# Patient Record
Sex: FEMALE
[De-identification: names, ages, dates, MRNs, and addresses within clinical notes are randomized per-mention and may not be internally consistent; named-entity substitution may affect disease eponyms.]

---

## 2017-10-12 ENCOUNTER — HOSPITAL ENCOUNTER (EMERGENCY)
Dept: HOSPITAL 5 - ED | Age: 32
LOS: 7 days | Discharge: PSYCHIATRIC OR SUBSTANCE USE FACILITY | End: 2017-10-19
Payer: COMMERCIAL

## 2017-10-12 DIAGNOSIS — T39.1X2A: ICD-10-CM

## 2017-10-12 DIAGNOSIS — J45.909: ICD-10-CM

## 2017-10-12 DIAGNOSIS — O9A.211: Primary | ICD-10-CM

## 2017-10-12 DIAGNOSIS — F17.200: ICD-10-CM

## 2017-10-12 DIAGNOSIS — O23.31: ICD-10-CM

## 2017-10-12 DIAGNOSIS — Z3A.08: ICD-10-CM

## 2017-10-12 LAB
ALBUMIN SERPL-MCNC: 3.2 G/DL (ref 3.9–5)
ALBUMIN/GLOB SERPL: 1 %
ALP SERPL-CCNC: 43 UNITS/L (ref 35–129)
ALT SERPL-CCNC: 16 UNITS/L (ref 7–56)
ANION GAP SERPL CALC-SCNC: 13 MMOL/L
BACTERIA #/AREA URNS HPF: (no result) /HPF
BASOPHILS NFR BLD AUTO: 0.7 % (ref 0–1.8)
BILIRUB SERPL-MCNC: < 0.2 MG/DL (ref 0.1–1.2)
BILIRUB UR QL STRIP: (no result)
BLOOD UR QL VISUAL: (no result)
BUN SERPL-MCNC: 8 MG/DL (ref 7–17)
BUN/CREAT SERPL: 13 %
CALCIUM SERPL-MCNC: 8.5 MG/DL (ref 8.4–10.2)
CHLORIDE SERPL-SCNC: 103.4 MMOL/L (ref 98–107)
CO2 SERPL-SCNC: 25 MMOL/L (ref 22–30)
EOSINOPHIL NFR BLD AUTO: 6.2 % (ref 0–4.3)
GLUCOSE SERPL-MCNC: 96 MG/DL (ref 65–100)
HCT VFR BLD CALC: 32.2 % (ref 30.3–42.9)
HGB BLD-MCNC: 10 GM/DL (ref 10.1–14.3)
KETONES UR STRIP-MCNC: (no result) MG/DL
LEUKOCYTE ESTERASE UR QL STRIP: (no result)
MCH RBC QN AUTO: 24 PG (ref 28–32)
MCHC RBC AUTO-ENTMCNC: 31 % (ref 30–34)
MCV RBC AUTO: 78 FL (ref 79–97)
MUCOUS THREADS #/AREA URNS HPF: (no result) /HPF
NITRITE UR QL STRIP: (no result)
PH UR STRIP: 6 [PH] (ref 5–7)
PLATELET # BLD: 277 K/MM3 (ref 140–440)
POTASSIUM SERPL-SCNC: 4.1 MMOL/L (ref 3.6–5)
PROT SERPL-MCNC: 6.3 G/DL (ref 6.3–8.2)
PROT UR STRIP-MCNC: (no result) MG/DL
RBC # BLD AUTO: 4.14 M/MM3 (ref 3.65–5.03)
RBC #/AREA URNS HPF: 1 /HPF (ref 0–6)
SODIUM SERPL-SCNC: 137 MMOL/L (ref 137–145)
URINE DRUGS OF ABUSE NOTE: (no result)
UROBILINOGEN UR-MCNC: < 2 MG/DL (ref ?–2)
WBC # BLD AUTO: 8.2 K/MM3 (ref 4.5–11)
WBC #/AREA URNS HPF: 18 /HPF (ref 0–6)

## 2017-10-12 PROCEDURE — 76801 OB US < 14 WKS SINGLE FETUS: CPT

## 2017-10-12 PROCEDURE — 93005 ELECTROCARDIOGRAM TRACING: CPT

## 2017-10-12 PROCEDURE — 80320 DRUG SCREEN QUANTALCOHOLS: CPT

## 2017-10-12 PROCEDURE — 80307 DRUG TEST PRSMV CHEM ANLYZR: CPT

## 2017-10-12 PROCEDURE — 84703 CHORIONIC GONADOTROPIN ASSAY: CPT

## 2017-10-12 PROCEDURE — 81001 URINALYSIS AUTO W/SCOPE: CPT

## 2017-10-12 PROCEDURE — 93010 ELECTROCARDIOGRAM REPORT: CPT

## 2017-10-12 PROCEDURE — 84702 CHORIONIC GONADOTROPIN TEST: CPT

## 2017-10-12 PROCEDURE — 85025 COMPLETE CBC W/AUTO DIFF WBC: CPT

## 2017-10-12 PROCEDURE — 96375 TX/PRO/DX INJ NEW DRUG ADDON: CPT

## 2017-10-12 PROCEDURE — 36415 COLL VENOUS BLD VENIPUNCTURE: CPT

## 2017-10-12 PROCEDURE — 80053 COMPREHEN METABOLIC PANEL: CPT

## 2017-10-12 PROCEDURE — 99285 EMERGENCY DEPT VISIT HI MDM: CPT

## 2017-10-12 PROCEDURE — 96365 THER/PROPH/DIAG IV INF INIT: CPT

## 2017-10-12 PROCEDURE — G0480 DRUG TEST DEF 1-7 CLASSES: HCPCS

## 2017-10-12 PROCEDURE — 76817 TRANSVAGINAL US OBSTETRIC: CPT

## 2017-10-12 PROCEDURE — 96361 HYDRATE IV INFUSION ADD-ON: CPT

## 2017-10-12 NOTE — ULTRASOUND REPORT
FINAL REPORT



EXAM:  US OB \T\lt; = 14 WEEKS FETUS



HISTORY:  acess the fetus patient recently overdosed 



TECHNIQUE:  Real-time sonography was performed of the gravid

uterus transabdominally and endovaginally and images are

submitted for interpretation. 



PRIORS:  None.



FINDINGS:  

The uterus appears normal and has a grossly normal appearing

gestational sac. There is a normal appearing fetal pole measuring

2.2 centimeters for an estimated gestational age of 8 weeks 6

days. A normal-appearing yolk sac is identified. The fetal heart

is beating at a rate of 160 beats per minute. Both ovaries are

visualized and appear normal. The right ovary measures 2.9 x 2.3

x 3.1 cm and the left ovary measures 2.8 x 1.5 x 2.3 cm. There is

a collapsed corpus luteum in the right ovary. 



IMPRESSION:  

Single live intrauterine gestation, estimated gestational age 8

weeks 6 days for an estimated date of confinement of 05/18/2018

## 2017-10-12 NOTE — ULTRASOUND REPORT
FINAL REPORT



EXAM:  US OB TRANSVAGINAL



HISTORY:  PT RECENT OD;  ATTEMPTED SUICIDE 



TECHNIQUE:  Real-time sonography was performed of the gravid

uterus transabdominally and endovaginally and images are

submitted for interpretation. 



PRIORS:  None.



FINDINGS:  

 The uterus appears normal and has a grossly normal appearing

gestational sac. There is a normal appearing fetal pole measuring

2.2 centimeters for an estimated gestational age of 8 weeks 6

days. A normal-appearing yolk sac is identified. The fetal heart

is beating at a rate of 160 beats per minute. Both ovaries are

visualized and appear normal. The right ovary measures 2.9 x 2.3

x 3.1 cm and the left ovary measures 2.8 x 1.5 x 2.3 cm. There is

a collapsed corpus luteum in the right ovary. 



IMPRESSION:  

Single live intrauterine gestation, estimated gestational age 8

weeks 6 days for an estimated date of confinement of 05/18/2018

## 2017-10-12 NOTE — EMERGENCY DEPARTMENT REPORT
ED General Adult HPI





- General


Chief complaint: Overdose


Stated complaint: POSS SUICIDE ATTEMPT


Time Seen by Provider: 10/12/17 16:16


Source: patient


Mode of arrival: Stretcher


Limitations: No Limitations





- History of Present Illness


Initial comments: 





Patient is a 32-year-old female no significant past medical history presents 

status post suicide attempt.  Patient took one bottle of over-the-counter 

medication.  She states that it was "sleep medication with a PM on the label."  

Patient is not aware of the exact amount until she took or the exact contents 

of the bottle.  Patient feels tired and feels that her stomach is burning.  She 

states her abdominal pain is a 6 out of 10 nothing makes her pain better or 

worse.  Patient denies having any chest pain or having any headache.





- Related Data


 Home Medications











 Medication  Instructions  Recorded  Confirmed  Last Taken


 


ALBUTEROL Inhaler [Proair] 2 puff IH QID PRN 01/12/14 10/12/17 02/13/14 23:00


 


Fluticasone/Salmeterol [Advair 1 puff IH BID 01/12/14 10/12/17 01/12/14





Diskus 250-50 mcg]    


 


Pnv95/Ferrous Fumarate/FA 1 each PO QDAY 01/12/14 10/12/17 02/17/14 05:30





[Prenatal Vitamins]    1 tablet











 Allergies











Allergy/AdvReac Type Severity Reaction Status Date / Time


 


fish derived Allergy  Anaphylaxis Verified 02/18/14 08:41














ED Review of Systems


ROS: 


Stated complaint: POSS SUICIDE ATTEMPT


Other details as noted in HPI





Constitutional: weakness.  denies: chills, fever


Eyes: denies: eye pain, eye discharge, vision change


ENT: denies: ear pain, throat pain


Respiratory: denies: cough, shortness of breath, wheezing


Cardiovascular: denies: chest pain, palpitations


Endocrine: no symptoms reported


Gastrointestinal: abdominal pain, nausea.  denies: diarrhea


Genitourinary: denies: urgency, dysuria, discharge


Musculoskeletal: denies: back pain, joint swelling, arthralgia


Skin: denies: rash, lesions


Neurological: denies: headache, weakness, paresthesias


Psychiatric: suicidal thoughts.  denies: anxiety, depression


Hematological/Lymphatic: denies: easy bleeding, easy bruising





ED Past Medical Hx





- Past Medical History


Hx Hypertension: No


Hx Congestive Heart Failure: No


Hx Diabetes: No


Hx Deep Vein Thrombosis: No


Hx Renal Disease: No


Hx Sickle Cell Disease: No


Hx Seizures: No


Hx Asthma: Yes


Hx COPD: No


Hx HIV: No





- Surgical History


Hx Cholecystectomy: Yes


Hx Appendectomy: Yes





- Social History


Smoking Status: Current Every Day Smoker


Substance Use Type: None





- Medications


Home Medications: 


 Home Medications











 Medication  Instructions  Recorded  Confirmed  Last Taken  Type


 


ALBUTEROL Inhaler [Proair] 2 puff IH QID PRN 01/12/14 10/12/17 02/13/14 23:00 

History


 


Fluticasone/Salmeterol [Advair 1 puff IH BID 01/12/14 10/12/17 01/12/14 History





Diskus 250-50 mcg]     


 


Pnv95/Ferrous Fumarate/FA 1 each PO QDAY 01/12/14 10/12/17 02/17/14 05:30 

History





[Prenatal Vitamins]    1 tablet 














ED Physical Exam





- General


Limitations: No Limitations


General appearance: alert, in no apparent distress





- Head


Head exam: Present: atraumatic, normocephalic





- Eye


Eye exam: Present: normal appearance





- ENT


ENT exam: Present: mucous membranes moist





- Neck


Neck exam: Present: normal inspection





- Respiratory


Respiratory exam: Present: normal lung sounds bilaterally.  Absent: respiratory 

distress





- Cardiovascular


Cardiovascular Exam: Present: normal rhythm, tachycardia.  Absent: systolic 

murmur, diastolic murmur, rubs, gallop





- GI/Abdominal


GI/Abdominal exam: Present: soft, normal bowel sounds





- Extremities Exam


Extremities exam: Present: normal inspection





- Back Exam


Back exam: Present: normal inspection





- Neurological Exam


Neurological exam: Present: alert, oriented X3





- Psychiatric


Psychiatric exam: Present: depressed, flat affect, suicidal ideation





- Skin


Skin exam: Present: warm, dry, intact, normal color.  Absent: rash





ED Course


 Vital Signs











  10/12/17 10/12/17 10/12/17





  16:14 18:46 20:15


 


Temperature 98.4 F  98.0 F


 


Pulse Rate 98 H 94 H 71


 


Respiratory 18  16





Rate   


 


Blood Pressure 126/73  


 


Blood Pressure  122/74 122/80





[Left]   


 


O2 Sat by Pulse 100  100





Oximetry   














- Consultations


Consultation #1: 





10/12/17 15:02


Called poison control patient's Tylenol level is 120 she will need a repeat 

Tylenol level in 4 hours.


Consultation #2: 





10/12/17 19:01


Discussed with poison control patient is cleared due to repeat Tylenol level 

being less than 150.  It is 93.





ED Medical Decision Making





- Lab Data


Result diagrams: 


 10/12/17 16:47





 10/12/17 16:47








 Lab Results











  10/12/17 10/12/17 10/12/17 Range/Units





  16:47 16:47 16:47 


 


WBC    8.2  (4.5-11.0)  K/mm3


 


RBC    4.14  (3.65-5.03)  M/mm3


 


Hgb    10.0 L  (10.1-14.3)  gm/dl


 


Hct    32.2  (30.3-42.9)  %


 


MCV    78 L  (79-97)  fl


 


MCH    24 L  (28-32)  pg


 


MCHC    31  (30-34)  %


 


RDW    19.0 H  (13.2-15.2)  %


 


Plt Count    277  (140-440)  K/mm3


 


Lymph % (Auto)    27.6  (13.4-35.0)  %


 


Mono % (Auto)    9.0 H  (0.0-7.3)  %


 


Eos % (Auto)    6.2 H  (0.0-4.3)  %


 


Baso % (Auto)    0.7  (0.0-1.8)  %


 


Lymph #    2.3  (1.2-5.4)  K/mm3


 


Mono #    0.7  (0.0-0.8)  K/mm3


 


Eos #    0.5 H  (0.0-0.4)  K/mm3


 


Baso #    0.1  (0.0-0.1)  K/mm3


 


Seg Neutrophils %    56.5  (40.0-70.0)  %


 


Seg Neutrophils #    4.6  (1.8-7.7)  K/mm3


 


Sodium  137    (137-145)  mmol/L


 


Potassium  4.1    (3.6-5.0)  mmol/L


 


Chloride  103.4    ()  mmol/L


 


Carbon Dioxide  25    (22-30)  mmol/L


 


Anion Gap  13    mmol/L


 


BUN  8    (7-17)  mg/dL


 


Creatinine  0.6 L    (0.7-1.2)  mg/dL


 


Estimated GFR  > 60    ml/min


 


BUN/Creatinine Ratio  13    %


 


Glucose  96    ()  mg/dL


 


Calcium  8.5    (8.4-10.2)  mg/dL


 


Total Bilirubin  < 0.20    (0.1-1.2)  mg/dL


 


AST  12    (5-40)  units/L


 


ALT  16    (7-56)  units/L


 


Alkaline Phosphatase  43    ()  units/L


 


Total Protein  6.3    (6.3-8.2)  g/dL


 


Albumin  3.2 L    (3.9-5)  g/dL


 


Albumin/Globulin Ratio  1.0    %


 


HCG, Qual     (Negative)  


 


HCG, Quant     (0-4)  mIU/mL


 


Urine Color     (Yellow)  


 


Urine Turbidity     (Clear)  


 


Urine pH     (5.0-7.0)  


 


Ur Specific Gravity     (1.003-1.030)  


 


Urine Protein     (Negative)  mg/dL


 


Urine Glucose (UA)     (Negative)  mg/dL


 


Urine Ketones     (Negative)  mg/dL


 


Urine Blood     (Negative)  


 


Urine Nitrite     (Negative)  


 


Urine Bilirubin     (Negative)  


 


Urine Urobilinogen     (<2.0)  mg/dL


 


Ur Leukocyte Esterase     (Negative)  


 


Urine WBC (Auto)     (0.0-6.0)  /HPF


 


Urine RBC (Auto)     (0.0-6.0)  /HPF


 


U Epithel Cells (Auto)     (0-13.0)  /HPF


 


Urine Bacteria (Auto)     (Negative)  /HPF


 


Urine Mucus     /HPF


 


Salicylates   < 0.3 L   (2.8-20.0)  mg/dL


 


Urine Opiates Screen     


 


Urine Methadone Screen     


 


Acetaminophen     (10.0-30.0)  ug/mL


 


Ur Barbiturates Screen     


 


Ur Phencyclidine Scrn     


 


Ur Amphetamines Screen     


 


U Benzodiazepines Scrn     


 


Urine Cocaine Screen     


 


U Marijuana (THC) Screen     


 


Drugs of Abuse Note     


 


Plasma/Serum Alcohol     (0-0.07)  gm%














  10/12/17 10/12/17 10/12/17 Range/Units





  16:47 16:52 16:52 


 


WBC     (4.5-11.0)  K/mm3


 


RBC     (3.65-5.03)  M/mm3


 


Hgb     (10.1-14.3)  gm/dl


 


Hct     (30.3-42.9)  %


 


MCV     (79-97)  fl


 


MCH     (28-32)  pg


 


MCHC     (30-34)  %


 


RDW     (13.2-15.2)  %


 


Plt Count     (140-440)  K/mm3


 


Lymph % (Auto)     (13.4-35.0)  %


 


Mono % (Auto)     (0.0-7.3)  %


 


Eos % (Auto)     (0.0-4.3)  %


 


Baso % (Auto)     (0.0-1.8)  %


 


Lymph #     (1.2-5.4)  K/mm3


 


Mono #     (0.0-0.8)  K/mm3


 


Eos #     (0.0-0.4)  K/mm3


 


Baso #     (0.0-0.1)  K/mm3


 


Seg Neutrophils %     (40.0-70.0)  %


 


Seg Neutrophils #     (1.8-7.7)  K/mm3


 


Sodium     (137-145)  mmol/L


 


Potassium     (3.6-5.0)  mmol/L


 


Chloride     ()  mmol/L


 


Carbon Dioxide     (22-30)  mmol/L


 


Anion Gap     mmol/L


 


BUN     (7-17)  mg/dL


 


Creatinine     (0.7-1.2)  mg/dL


 


Estimated GFR     ml/min


 


BUN/Creatinine Ratio     %


 


Glucose     ()  mg/dL


 


Calcium     (8.4-10.2)  mg/dL


 


Total Bilirubin     (0.1-1.2)  mg/dL


 


AST     (5-40)  units/L


 


ALT     (7-56)  units/L


 


Alkaline Phosphatase     ()  units/L


 


Total Protein     (6.3-8.2)  g/dL


 


Albumin     (3.9-5)  g/dL


 


Albumin/Globulin Ratio     %


 


HCG, Qual  Positive    (Negative)  


 


HCG, Quant     (0-4)  mIU/mL


 


Urine Color     (Yellow)  


 


Urine Turbidity     (Clear)  


 


Urine pH     (5.0-7.0)  


 


Ur Specific Gravity     (1.003-1.030)  


 


Urine Protein     (Negative)  mg/dL


 


Urine Glucose (UA)     (Negative)  mg/dL


 


Urine Ketones     (Negative)  mg/dL


 


Urine Blood     (Negative)  


 


Urine Nitrite     (Negative)  


 


Urine Bilirubin     (Negative)  


 


Urine Urobilinogen     (<2.0)  mg/dL


 


Ur Leukocyte Esterase     (Negative)  


 


Urine WBC (Auto)     (0.0-6.0)  /HPF


 


Urine RBC (Auto)     (0.0-6.0)  /HPF


 


U Epithel Cells (Auto)     (0-13.0)  /HPF


 


Urine Bacteria (Auto)     (Negative)  /HPF


 


Urine Mucus     /HPF


 


Salicylates     (2.8-20.0)  mg/dL


 


Urine Opiates Screen     


 


Urine Methadone Screen     


 


Acetaminophen   102.7 H   (10.0-30.0)  ug/mL


 


Ur Barbiturates Screen     


 


Ur Phencyclidine Scrn     


 


Ur Amphetamines Screen     


 


U Benzodiazepines Scrn     


 


Urine Cocaine Screen     


 


U Marijuana (THC) Screen     


 


Drugs of Abuse Note     


 


Plasma/Serum Alcohol    < 0.01  (0-0.07)  gm%














  10/12/17 10/12/17 10/12/17 Range/Units





  17:41 18:19 18:19 


 


WBC     (4.5-11.0)  K/mm3


 


RBC     (3.65-5.03)  M/mm3


 


Hgb     (10.1-14.3)  gm/dl


 


Hct     (30.3-42.9)  %


 


MCV     (79-97)  fl


 


MCH     (28-32)  pg


 


MCHC     (30-34)  %


 


RDW     (13.2-15.2)  %


 


Plt Count     (140-440)  K/mm3


 


Lymph % (Auto)     (13.4-35.0)  %


 


Mono % (Auto)     (0.0-7.3)  %


 


Eos % (Auto)     (0.0-4.3)  %


 


Baso % (Auto)     (0.0-1.8)  %


 


Lymph #     (1.2-5.4)  K/mm3


 


Mono #     (0.0-0.8)  K/mm3


 


Eos #     (0.0-0.4)  K/mm3


 


Baso #     (0.0-0.1)  K/mm3


 


Seg Neutrophils %     (40.0-70.0)  %


 


Seg Neutrophils #     (1.8-7.7)  K/mm3


 


Sodium     (137-145)  mmol/L


 


Potassium     (3.6-5.0)  mmol/L


 


Chloride     ()  mmol/L


 


Carbon Dioxide     (22-30)  mmol/L


 


Anion Gap     mmol/L


 


BUN     (7-17)  mg/dL


 


Creatinine     (0.7-1.2)  mg/dL


 


Estimated GFR     ml/min


 


BUN/Creatinine Ratio     %


 


Glucose     ()  mg/dL


 


Calcium     (8.4-10.2)  mg/dL


 


Total Bilirubin     (0.1-1.2)  mg/dL


 


AST     (5-40)  units/L


 


ALT     (7-56)  units/L


 


Alkaline Phosphatase     ()  units/L


 


Total Protein     (6.3-8.2)  g/dL


 


Albumin     (3.9-5)  g/dL


 


Albumin/Globulin Ratio     %


 


HCG, Qual     (Negative)  


 


HCG, Quant  016159 H    (0-4)  mIU/mL


 


Urine Color   Yellow   (Yellow)  


 


Urine Turbidity   Clear   (Clear)  


 


Urine pH   6.0   (5.0-7.0)  


 


Ur Specific Gravity   1.014   (1.003-1.030)  


 


Urine Protein   <15 mg/dl   (Negative)  mg/dL


 


Urine Glucose (UA)   Neg   (Negative)  mg/dL


 


Urine Ketones   Neg   (Negative)  mg/dL


 


Urine Blood   Neg   (Negative)  


 


Urine Nitrite   Pos   (Negative)  


 


Urine Bilirubin   Neg   (Negative)  


 


Urine Urobilinogen   < 2.0   (<2.0)  mg/dL


 


Ur Leukocyte Esterase   Tr   (Negative)  


 


Urine WBC (Auto)   18.0 H   (0.0-6.0)  /HPF


 


Urine RBC (Auto)   1.0   (0.0-6.0)  /HPF


 


U Epithel Cells (Auto)   < 1.0   (0-13.0)  /HPF


 


Urine Bacteria (Auto)   2+   (Negative)  /HPF


 


Urine Mucus   Few   /HPF


 


Salicylates     (2.8-20.0)  mg/dL


 


Urine Opiates Screen    Presumptive negative  


 


Urine Methadone Screen    Presumptive negative  


 


Acetaminophen     (10.0-30.0)  ug/mL


 


Ur Barbiturates Screen    Presumptive negative  


 


Ur Phencyclidine Scrn    Presumptive negative  


 


Ur Amphetamines Screen    Presumptive positive  


 


U Benzodiazepines Scrn    Presumptive negative  


 


Urine Cocaine Screen    Presumptive negative  


 


U Marijuana (THC) Screen    Presumptive negative  


 


Drugs of Abuse Note    Disclamer  


 


Plasma/Serum Alcohol     (0-0.07)  gm%














  10/12/17 Range/Units





  Unknown 


 


WBC   (4.5-11.0)  K/mm3


 


RBC   (3.65-5.03)  M/mm3


 


Hgb   (10.1-14.3)  gm/dl


 


Hct   (30.3-42.9)  %


 


MCV   (79-97)  fl


 


MCH   (28-32)  pg


 


MCHC   (30-34)  %


 


RDW   (13.2-15.2)  %


 


Plt Count   (140-440)  K/mm3


 


Lymph % (Auto)   (13.4-35.0)  %


 


Mono % (Auto)   (0.0-7.3)  %


 


Eos % (Auto)   (0.0-4.3)  %


 


Baso % (Auto)   (0.0-1.8)  %


 


Lymph #   (1.2-5.4)  K/mm3


 


Mono #   (0.0-0.8)  K/mm3


 


Eos #   (0.0-0.4)  K/mm3


 


Baso #   (0.0-0.1)  K/mm3


 


Seg Neutrophils %   (40.0-70.0)  %


 


Seg Neutrophils #   (1.8-7.7)  K/mm3


 


Sodium   (137-145)  mmol/L


 


Potassium   (3.6-5.0)  mmol/L


 


Chloride   ()  mmol/L


 


Carbon Dioxide   (22-30)  mmol/L


 


Anion Gap   mmol/L


 


BUN   (7-17)  mg/dL


 


Creatinine   (0.7-1.2)  mg/dL


 


Estimated GFR   ml/min


 


BUN/Creatinine Ratio   %


 


Glucose   ()  mg/dL


 


Calcium   (8.4-10.2)  mg/dL


 


Total Bilirubin   (0.1-1.2)  mg/dL


 


AST   (5-40)  units/L


 


ALT   (7-56)  units/L


 


Alkaline Phosphatase   ()  units/L


 


Total Protein   (6.3-8.2)  g/dL


 


Albumin   (3.9-5)  g/dL


 


Albumin/Globulin Ratio   %


 


HCG, Qual   (Negative)  


 


HCG, Quant   (0-4)  mIU/mL


 


Urine Color   (Yellow)  


 


Urine Turbidity   (Clear)  


 


Urine pH   (5.0-7.0)  


 


Ur Specific Gravity   (1.003-1.030)  


 


Urine Protein   (Negative)  mg/dL


 


Urine Glucose (UA)   (Negative)  mg/dL


 


Urine Ketones   (Negative)  mg/dL


 


Urine Blood   (Negative)  


 


Urine Nitrite   (Negative)  


 


Urine Bilirubin   (Negative)  


 


Urine Urobilinogen   (<2.0)  mg/dL


 


Ur Leukocyte Esterase   (Negative)  


 


Urine WBC (Auto)   (0.0-6.0)  /HPF


 


Urine RBC (Auto)   (0.0-6.0)  /HPF


 


U Epithel Cells (Auto)   (0-13.0)  /HPF


 


Urine Bacteria (Auto)   (Negative)  /HPF


 


Urine Mucus   /HPF


 


Salicylates   (2.8-20.0)  mg/dL


 


Urine Opiates Screen   


 


Urine Methadone Screen   


 


Acetaminophen  93.6 H  (10.0-30.0)  ug/mL


 


Ur Barbiturates Screen   


 


Ur Phencyclidine Scrn   


 


Ur Amphetamines Screen   


 


U Benzodiazepines Scrn   


 


Urine Cocaine Screen   


 


U Marijuana (THC) Screen   


 


Drugs of Abuse Note   


 


Plasma/Serum Alcohol   (0-0.07)  gm%














- EKG Data


-: EKG Interpreted by Me





- EKG Data





10/13/17 01:06


EKG shows normal sinus rhythm no T wave inversion and no axis deviation no ST 

segment elevation





- Radiology Data


Radiology results: report reviewed, image reviewed





Transvaginal ultrasound: Shows viable fetus at 8 weeks with viable heart rate.





- Medical Decision Making





Chief medical diagnosis: Acetaminophen overdose


Differential medical diagnosis: Salicylate overdose, NSAID overdose, major 

depressive disorder








I will get CBC, CMP, urine drug screen, salicylate, Tylenol level, IV fluids, 

mental health evaluation and poison control consult








Patient will need to be 1013 due to her trying to attempt suicide.  Patient has 

been medically cleared as her Tylenol level after 4 hours is not 150.  Patient 

does not need NAC.  Patient will get IV antibiotics for her UTI.


Critical care attestation.: 


If time is entered above; I have spent that time in minutes in the direct care 

of this critically ill patient, excluding procedure time.








ED Disposition


Clinical Impression: 


 Suicide attempt





Pregnancy


Qualifiers:


 Weeks of gestation: 8 weeks Qualified Code(s): Z3A.08 - 8 weeks gestation of 

pregnancy





UTI (urinary tract infection)


Qualifiers:


 Urinary tract infection type: acute cystitis Hematuria presence: without 

hematuria Qualified Code(s): N30.00 - Acute cystitis without hematuria





Tylenol overdose


Qualifiers:


 Encounter type: initial encounter Injury intent: intentional self-harm 

Qualified Code(s): T39.1X2A - Poisoning by 4-Aminophenol derivatives, 

intentional self-harm, initial encounter





Disposition: DC/TX-65 PSY HOSP/PSY UNIT


Is pt being admited?: No


Does the pt Need Aspirin: No


Condition: Stable


Referrals: 


PRIMARY CARE,MD [Primary Care Provider] - 3-5 Days

## 2017-10-13 NOTE — CONSULTATION
History of Present Illness





- Reason for Consult


Consult date: 10/13/17


Reason for consult: Mental Health Evaluation


Requesting physician: NGHIA KUMAR





- Chief Complaint


Chief complaint: 


"I was overwhelmed"








- History of Present Psychiatric Illness


32-year-old female no significant past medical history presents status post 

suicide attempt. Today patient is calm, but guarded during the assessment. She 

stated that she felt "overwhelmed with life" and took multiple Tylenol PM 

pills. She stated that she did not want to wake up when she took the pills. 

When asked to explained the stressors she maybe be dealing with, she would not 

answer the question. She kept stating, "I was overwhelmed that's all." She 

denies a past suicidal attempt. She stated that she was dx with ADHD as a child 

and take Ritalin. Patient is positive for amphetamines. She denies SI/HI's, AVH'

s, and depression symptoms. She denies recreational drug use and excessive 

alcohol consumption (etoh). 








Medications and Allergies


 Allergies











Allergy/AdvReac Type Severity Reaction Status Date / Time


 


fish derived Allergy  Anaphylaxis Verified 02/18/14 08:41











 Home Medications











 Medication  Instructions  Recorded  Confirmed  Last Taken  Type


 


ALBUTEROL Inhaler [Proair] 2 puff IH QID PRN 01/12/14 10/12/17 02/13/14 23:00 

History


 


Fluticasone/Salmeterol [Advair 1 puff IH BID 01/12/14 10/12/17 01/12/14 History





Diskus 250-50 mcg]     


 


Pnv95/Ferrous Fumarate/FA 1 each PO QDAY 01/12/14 10/12/17 02/17/14 05:30 

History





[Prenatal Vitamins]    1 tablet 














Past psychiatric history





- Past Medical History


Past Medical History: other (Currently Pregnant)


Past Surgical History: appendectomy, cholecystectomy





- past Psychiatric treatment and history


psychiatric treatment history: 


Dx with ADHD as a child. Denies a fam psy.








- Social History


Social history: lives with family (GED)





Mental Status Exam





- Vital signs


 Last Vital Signs











Temp  98.9 F   10/13/17 10:59


 


Pulse  88   10/13/17 10:59


 


Resp  16   10/13/17 10:59


 


BP  116/69   10/13/17 10:59


 


Pulse Ox  97   10/13/17 10:59














- Exam


Narrative exam: 


MSE:





 Appearance: calm


 Behavior: regular eye contact


 Speech: regular rate and tone


 Mood: guarded


 Affect: flat 


 Thought Process: circumstantial


 Thought Content: denies SI/HI's and VH's


 Motor Activity: ambulatory


 Cognition: A/O x3


 Insight: variable


 Judgment: variable











Results


Result Diagrams: 


 10/12/17 16:47





 10/12/17 16:47


 Abnormal lab results











  10/12/17 10/12/17 10/12/17 Range/Units





  16:47 16:47 16:47 


 


Hgb    10.0 L  (10.1-14.3)  gm/dl


 


MCV    78 L  (79-97)  fl


 


MCH    24 L  (28-32)  pg


 


RDW    19.0 H  (13.2-15.2)  %


 


Mono % (Auto)    9.0 H  (0.0-7.3)  %


 


Eos % (Auto)    6.2 H  (0.0-4.3)  %


 


Eos #    0.5 H  (0.0-0.4)  K/mm3


 


Creatinine  0.6 L    (0.7-1.2)  mg/dL


 


Albumin  3.2 L    (3.9-5)  g/dL


 


HCG, Quant     (0-4)  mIU/mL


 


Urine WBC (Auto)     (0.0-6.0)  /HPF


 


Salicylates   < 0.3 L   (2.8-20.0)  mg/dL


 


Acetaminophen     (10.0-30.0)  ug/mL














  10/12/17 10/12/17 10/12/17 Range/Units





  16:52 17:41 18:19 


 


Hgb     (10.1-14.3)  gm/dl


 


MCV     (79-97)  fl


 


MCH     (28-32)  pg


 


RDW     (13.2-15.2)  %


 


Mono % (Auto)     (0.0-7.3)  %


 


Eos % (Auto)     (0.0-4.3)  %


 


Eos #     (0.0-0.4)  K/mm3


 


Creatinine     (0.7-1.2)  mg/dL


 


Albumin     (3.9-5)  g/dL


 


HCG, Quant   516851 H   (0-4)  mIU/mL


 


Urine WBC (Auto)    18.0 H  (0.0-6.0)  /HPF


 


Salicylates     (2.8-20.0)  mg/dL


 


Acetaminophen  102.7 H    (10.0-30.0)  ug/mL














  10/12/17 10/13/17 Range/Units





  Unknown 00:42 


 


Hgb    (10.1-14.3)  gm/dl


 


MCV    (79-97)  fl


 


MCH    (28-32)  pg


 


RDW    (13.2-15.2)  %


 


Mono % (Auto)    (0.0-7.3)  %


 


Eos % (Auto)    (0.0-4.3)  %


 


Eos #    (0.0-0.4)  K/mm3


 


Creatinine    (0.7-1.2)  mg/dL


 


Albumin    (3.9-5)  g/dL


 


HCG, Quant    (0-4)  mIU/mL


 


Urine WBC (Auto)    (0.0-6.0)  /HPF


 


Salicylates    (2.8-20.0)  mg/dL


 


Acetaminophen  93.6 H  57.7 H  (10.0-30.0)  ug/mL








All other labs normal.








Assessment and Plan


Assessment and plan: 


Impression: Historical Dx: ADHD. 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 Patient is calm, but 

guarded during the assessment. Patient is pregnant with her 7th child. She has 

custody of her youngest child (10 months). Current Tylenol level 57.7. Patient 

minimizes her actions. 





DDx: MDD, R/O Bipolar





Recommendation/Plan: Continue 1013. Gather collateral to help determine 

treatment.

## 2017-10-14 NOTE — PROGRESS NOTE
Subjective





- Reason for Consult


Reason for consult: recent OD





- Chief Complaint


Chief complaint: 





Patient very guarded and irritable. She refused to engage in the discussion and 

demanded discharge or she will "show her behind". 





Mental Status Exam





- Vital signs


 Last Vital Signs











Temp  98.7 F   10/13/17 20:13


 


Pulse  95 H  10/13/17 20:13


 


Resp  17   10/14/17 10:00


 


BP  108/60   10/13/17 20:13


 


Pulse Ox  99   10/14/17 10:00














- Exam


Orientation: time, place, person


Affect: anxious


Mood: anxious


Thought content: other (no AVH, no HI)


Thought Process: Intact


Perceptions: none


Speech: normal rate and pattern


Concentration: focused


Motor activity: lethargic


Level of consciousness: alert


Memory: Intact


Interaction: hostile, irritable





Assessment and Plan





Plan:


Continue 1013 and attempt to transfer to an inpatient facility for further 

assessment and treatment that address her recent overdose

## 2017-10-15 NOTE — PROGRESS NOTE
Subjective





- Reason for Consult


Consult date: 10/15/17


Reason for consult: psychiatric follow up





- Chief Complaint


Chief complaint: 





She states she is not going to harm herself and wants to return to her family. 

She denies use of illicit drugs. She states she did not know she was pregnant. 

She states she cannot stay another night or she will "show her ass."  





Mental Status Exam





- Vital signs


 Last Vital Signs











Temp  99.4 F   10/15/17 08:01


 


Pulse  91 H  10/15/17 08:01


 


Resp  16   10/15/17 10:03


 


BP  107/64   10/15/17 08:01


 


Pulse Ox  100   10/15/17 10:03














- Exam


Narrative exam: 





- Exam


Orientation: time, place, person


Affect: anxious


Mood: anxious


Thought content: other (no AVH, no SI or HI)


Thought Process: Intact


Perceptions: none


Speech: normal rate and pattern


Concentration: focused


Motor activity: within normal limits


Level of consciousness: alert


Memory: Intact


Interaction:  irritable











Assessment and Plan


Assessment: suicide attempt


Historical Dx: ADHD. 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 Patient is calm, but guarded during 

the assessment. Patient is pregnant with her 7th child. She has custody of her 

youngest child (10 months). Current Tylenol level <15 on 10/14/2017.  Patient 

minimizes her actions. 





Plan:


Continue 1013 and attempt to transfer to an inpatient facility for further 

assessment and treatment that address her recent overdose

## 2017-10-16 NOTE — PROGRESS NOTE
Subjective





- Reason for Consult


Consult date: 10/16/17


Reason for consult: Psychiatry Follow-up





- Chief Complaint


Chief complaint: 


"I am ready to go"





32-year-old female no significant past medical history presents status post 

suicide attempt. Today patient is calm, but still guarded during the 

assessment. She continue to state that she only took multiple pills because was 

"tired." She jordan SI/HI's and AVH's. 





Mental Status Exam





- Vital signs


 Last Vital Signs











Temp  98 F   10/15/17 22:43


 


Pulse  85   10/15/17 22:43


 


Resp  18   10/15/17 22:43


 


BP  116/62   10/15/17 22:43


 


Pulse Ox  100   10/15/17 22:43














- Exam


Narrative exam: 


MSE:





 Appearance: calm


 Behavior: regular eye contact


 Speech: regular rate and tone


 Mood: guarded


 Affect: flat 


 Thought Process: circumstantial


 Thought Content: denies SI/HI's and VH's


 Motor Activity: ambulatory


 Cognition: A/O x3


 Insight: variable


 Judgment: variable








Assessment and Plan


Impression: Historical Dx: ADHD. 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 Patient is calm, but 

guarded during the assessment. Current Tylenol level < 15. Patient still 

minimizes her actions. 





DDx: MDD, R/O Bipolar





Recommendation/Plan: Continue 1013 and attempt placement to inpatient psy 

services. Attempted to get collateral information from her father Juan Francisco Coker at [phone]. The number isn't accepting incoming calls at this time.

## 2017-10-17 NOTE — PROGRESS NOTE
Subjective





- Reason for Consult


Consult date: 10/17/17


Reason for consult: Psychiatry Follow-up





- Chief Complaint


Chief complaint: 


"Hello"





32-year-old female no significant past medical history presents status post 

suicide attempt. Today patient is calm and cooperative during the assessment. 

The patient is more engaging and forthcoming than previous assessments. She 

stated that she has done a lot of thinking the last couple days about her 

actions prior to her admission to [facility]. She stated that life had come to a "head

" and wanted to get rest so she took the Tylenol pills. She stated that she did 

not want to die, because she want to live for herself and her children. She 

stated that she is willing to attend therapy sessions once discharged. She 

denies SI/HI''s and AVH's. She denies being depressed. She stated that she miss 

her family. 





Collateral information obtained from her Aunt Lynette Leone [phone]. She 

stated that she was "shocked" that the patient took multiple pills to kill 

herself. She don't believe the patient, her niece wanted to kill herself, 

because she have "beautiful children to take care of." She isn't aware of a the 

patient having  bipolar do, depression, or any other mental illness. She stated 

once patient is discharge she can pick her up. 





Mental Status Exam





- Vital signs


 Last Vital Signs











Temp  99.0 F   10/16/17 12:47


 


Pulse  95 H  10/17/17 08:21


 


Resp  18   10/17/17 08:21


 


BP  106/64   10/17/17 08:21


 


Pulse Ox  97   10/17/17 05:53














- Exam


Narrative exam: 


MSE:





 Appearance: calm, cooperative


 Behavior: regular eye contact


 Speech: regular rate and tone


 Mood: "better"


 Affect: congruent


 Thought Process: circumstantial


 Thought Content: denies SI/HI's and VH's


 Motor Activity: ambulatory


 Cognition: A/O x3


 Insight: fair


 Judgment: fair

















Assessment and Plan


Impression: Historical Dx: ADHD. 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 Today patient is calm and 

cooperative during the assessment. Current Tylenol level <15.  





DDx: MDD, R/O Bipolar





Recommendation/Plan: Evaluate 1013 in 24 hours to determine dispo.

## 2017-10-18 VITALS — SYSTOLIC BLOOD PRESSURE: 109 MMHG | DIASTOLIC BLOOD PRESSURE: 72 MMHG
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
## 2022-01-19 ENCOUNTER — HOSPITAL ENCOUNTER (INPATIENT)
Dept: HOSPITAL 5 - LD | Age: 37
LOS: 1 days | Discharge: HOME | DRG: 769 | End: 2022-01-20
Attending: OBSTETRICS & GYNECOLOGY | Admitting: OBSTETRICS & GYNECOLOGY
Payer: MEDICAID

## 2022-01-19 DIAGNOSIS — Z90.49: ICD-10-CM

## 2022-01-19 DIAGNOSIS — Z91.013: ICD-10-CM

## 2022-01-19 DIAGNOSIS — O02.89: ICD-10-CM

## 2022-01-19 LAB
ALBUMIN SERPL-MCNC: 3.6 G/DL (ref 3.9–5)
ALT SERPL-CCNC: 14 UNITS/L (ref 7–56)
APTT BLD: 23.7 SEC. (ref 24.2–36.6)
BASOPHILS # (AUTO): 0 K/MM3 (ref 0–0.1)
BASOPHILS NFR BLD AUTO: 0.3 % (ref 0–1.8)
BASOPHILS NFR BLD AUTO: 0.4 % (ref 0–1.8)
BASOPHILS NFR BLD AUTO: 0.5 % (ref 0–1.8)
BUN SERPL-MCNC: 11 MG/DL (ref 7–17)
BUN/CREAT SERPL: 18 %
CALCIUM SERPL-MCNC: 9.1 MG/DL (ref 8.4–10.2)
EOSINOPHIL # BLD AUTO: 0.2 K/MM3 (ref 0–0.4)
EOSINOPHIL # BLD AUTO: 0.3 K/MM3 (ref 0–0.4)
EOSINOPHIL # BLD AUTO: 0.4 K/MM3 (ref 0–0.4)
EOSINOPHIL NFR BLD AUTO: 2.6 % (ref 0–4.3)
EOSINOPHIL NFR BLD AUTO: 3.3 % (ref 0–4.3)
EOSINOPHIL NFR BLD AUTO: 4.6 % (ref 0–4.3)
FIBRINOGEN PPP-MCNC: 372 MG/DL (ref 211–480)
HCT VFR BLD CALC: 25.5 % (ref 30.3–42.9)
HCT VFR BLD CALC: 29.8 % (ref 30.3–42.9)
HCT VFR BLD CALC: 37.3 % (ref 30.3–42.9)
HEMOLYSIS INDEX: 27
HGB BLD-MCNC: 12.2 GM/DL (ref 10.1–14.3)
HGB BLD-MCNC: 8.3 GM/DL (ref 10.1–14.3)
HGB BLD-MCNC: 9.5 GM/DL (ref 10.1–14.3)
INR PPP: 0.98 (ref 0.87–1.13)
LYMPHOCYTES # BLD AUTO: 1.6 K/MM3 (ref 1.2–5.4)
LYMPHOCYTES # BLD AUTO: 2.5 K/MM3 (ref 1.2–5.4)
LYMPHOCYTES # BLD AUTO: 2.5 K/MM3 (ref 1.2–5.4)
LYMPHOCYTES NFR BLD AUTO: 19.1 % (ref 13.4–35)
LYMPHOCYTES NFR BLD AUTO: 27.7 % (ref 13.4–35)
LYMPHOCYTES NFR BLD AUTO: 29.6 % (ref 13.4–35)
MCHC RBC AUTO-ENTMCNC: 32 % (ref 30–34)
MCHC RBC AUTO-ENTMCNC: 33 % (ref 30–34)
MCHC RBC AUTO-ENTMCNC: 33 % (ref 30–34)
MCV RBC AUTO: 85 FL (ref 79–97)
MONOCYTES # (AUTO): 0.4 K/MM3 (ref 0–0.8)
MONOCYTES # (AUTO): 0.6 K/MM3 (ref 0–0.8)
MONOCYTES # (AUTO): 0.6 K/MM3 (ref 0–0.8)
MONOCYTES % (AUTO): 5 % (ref 0–7.3)
MONOCYTES % (AUTO): 6 % (ref 0–7.3)
MONOCYTES % (AUTO): 7.7 % (ref 0–7.3)
PLATELET # BLD: 237 K/MM3 (ref 140–440)
PLATELET # BLD: 251 K/MM3 (ref 140–440)
PLATELET # BLD: 382 K/MM3 (ref 140–440)
RBC # BLD AUTO: 3.02 M/MM3 (ref 3.65–5.03)
RBC # BLD AUTO: 3.52 M/MM3 (ref 3.65–5.03)
RBC # BLD AUTO: 4.41 M/MM3 (ref 3.65–5.03)

## 2022-01-19 PROCEDURE — 80053 COMPREHEN METABOLIC PANEL: CPT

## 2022-01-19 PROCEDURE — P9016 RBC LEUKOCYTES REDUCED: HCPCS

## 2022-01-19 PROCEDURE — 76815 OB US LIMITED FETUS(S): CPT

## 2022-01-19 PROCEDURE — 86901 BLOOD TYPING SEROLOGIC RH(D): CPT

## 2022-01-19 PROCEDURE — 88305 TISSUE EXAM BY PATHOLOGIST: CPT

## 2022-01-19 PROCEDURE — 86900 BLOOD TYPING SEROLOGIC ABO: CPT

## 2022-01-19 PROCEDURE — 80307 DRUG TEST PRSMV CHEM ANLYZR: CPT

## 2022-01-19 PROCEDURE — 85730 THROMBOPLASTIN TIME PARTIAL: CPT

## 2022-01-19 PROCEDURE — 85025 COMPLETE CBC W/AUTO DIFF WBC: CPT

## 2022-01-19 PROCEDURE — 10D17ZZ EXTRACTION OF PRODUCTS OF CONCEPTION, RETAINED, VIA NATURAL OR ARTIFICIAL OPENING: ICD-10-PCS | Performed by: OBSTETRICS & GYNECOLOGY

## 2022-01-19 PROCEDURE — 76857 US EXAM PELVIC LIMITED: CPT

## 2022-01-19 PROCEDURE — 30233N1 TRANSFUSION OF NONAUTOLOGOUS RED BLOOD CELLS INTO PERIPHERAL VEIN, PERCUTANEOUS APPROACH: ICD-10-PCS | Performed by: OBSTETRICS & GYNECOLOGY

## 2022-01-19 PROCEDURE — 86850 RBC ANTIBODY SCREEN: CPT

## 2022-01-19 PROCEDURE — 85384 FIBRINOGEN ACTIVITY: CPT

## 2022-01-19 PROCEDURE — 36415 COLL VENOUS BLD VENIPUNCTURE: CPT

## 2022-01-19 PROCEDURE — 85610 PROTHROMBIN TIME: CPT

## 2022-01-19 PROCEDURE — 86920 COMPATIBILITY TEST SPIN: CPT

## 2022-01-19 NOTE — HISTORY AND PHYSICAL REPORT
History of Present Illness


Date of examination: 22


Date of admission: 


22 13:04





Chief complaint: 


vaginal bleeding and pain and delivered at home


History of present illness: 


 that delivered at home came to hospital with vag bleed and pain.  Pt 

brought the demise infant that she passed at home wrapped in a towel and same 

appears grossly intact without maceration.  Pt had no prenatal care and states 

her LMP was mid-september.  Pt states she has had all vag deliveries and after 

her 5th vag delivery she had 2 miscarriages and surgery to remove retained 

products and then the last 2 vag deliveries she had hemorrhage.  Pt desires 

future fertility.  Pt is not aware whether she passed her placenta but states 

that she continues to bleed and she has cramping.  





Past History


Past Medical History: no pertinent history


Past Surgical History: appendectomy, cholecystectomy


Social history: no significant social history





- Obstetrical History


: 10


Spontaneous Abortions: 2


Number of Living Children: 7





Medications and Allergies


                                    Allergies











Allergy/AdvReac Type Severity Reaction Status Date / Time


 


fish derived Allergy  Anaphylaxis Verified 14 08:41











                                Home Medications











 Medication  Instructions  Recorded  Confirmed  Last Taken  Type


 


Albuterol Mdi (or & Nicu Only) 2 puff IH QID PRN 01/12/14 10/12/17 02/13/14 

23:00 History





[Proair]     


 


Fluticasone/Salmeterol [Advair 1 puff IH BID 01/12/14 10/12/17 01/12/14 History





Diskus 250-50 mcg]     


 


Pnv95/Ferrous Fumarate/FA 1 each PO QDAY 01/12/14 10/12/17 02/17/14 05:30 

History





[Prenatal Vitamins]    1 tablet 


 


Ibuprofen [Motrin] 800 mg PO Q8HR PRN 21 Days #40 22  Unknown Rx





 tablet    


 


oxyCODONE /ACETAMINOPHEN [Percocet 1 tab PO Q4HR PRN 15 Days #30 tab 22  

Unknown Rx





5/325]     











Active Meds: 


Active Medications





Cefazolin Sodium (Cefazolin/Sterile Water 2 Gm/20 Ml Syringe)  2 gm IV PREOP NR


   Stop: 22 21:00


Sodium Chloride (Nacl 0.9% 500 Ml)  500 mls @ 0 mls/hr IV ONCE GAMA











Review of Systems


All systems: negative (vag bleed and pain)





- Vital Signs


Vital signs: 


                                   Vital Signs











Pulse BP


 


 115 H  142/77 


 


 22 13:08  22 13:08








                                        











Temp Pulse Resp BP Pulse Ox


 


 98.9 F   125 H  17   102/63   100 


 


 22 13:12  22 17:02  22 13:12  22 16:34  22 17:02














- Physical Exam


Breasts: Positive: deferred


Cardiovascular: Other (tacchycardia 100-115)


Lungs: Positive: Normal air movement


Abdomen: Positive: soft


Genitourinary (Female): Positive: normal external genitalia


Vulva: both: normal


Vagina: Positive: normal moisture


Cervix: Positive: other (cervix dilated 4cm with large amount of dark clots and 

no placenta palpated.  SSE with no placenta seen only clots)


Uterus: Positive: enlarged (12-14wks but limited due to pt habitus)





Results


Result Diagrams: 


                                 22 10:49





                                 22 13:41


                              Abnormal lab results











  22 Range/Units





  13:41 13:41 13:41 


 


RDW  16.4 H    (13.2-15.2)  %


 


Eos % (Auto)  4.6 H    (0.0-4.3)  %


 


Glucose   125 H   ()  mg/dL


 


Albumin   3.6 L   (3.9-5)  g/dL


 


Crossmatch    See Detail  








All other labs normal.








Assessment and Plan


Incomplete delivery with active vag bleed and clots, small formed demised fetus 

noted in renard in isolette


1. admit to labor and delivery and give pitocin IV and cytotec 800mcg per 

rectum, CBC and CMP ordered


2. Give IVF bolus and 2 access, cross match 2 units PRBD; Soto cath placed by 

me to monitor urine output


3. Discussed with pt the need for evaluation under anesthesia and dilatation for

removal of retained products


4. U/S ordered at bedside by radiology and heterogenous matter seen without 

blood flow, cannot determine if products retained.


5.  Consents obtained for procedure, and pt taken to the OR, anesthesiologist 

notified

## 2022-01-19 NOTE — ULTRASOUND REPORT
ULTRASOUND OBSTETRIC LIMITED 



INDICATION / CLINICAL INFORMATION: Retained products after delivery.



TECHNIQUE: Transabdominal.



COMPARISON: None available.



FINDINGS:



Uterus is enlarged, measuring 12.4 x 6.5 x 7.2 cm. The endometrial complex is thickened and heterogen
eous, measuring 3.4 cm. There is minimal peripheral vascularity without discrete internal vascularity
.



IMPRESSION: Thickened endometrial complex in a postpartum uterus. It is difficult to distinguish a po
stpartum uterus containing clots from retained products of conception. Clinical correlation is recomm
ended.



Signer Name: Frankie Michaels MD 

Signed: 1/19/2022 3:54 PM

Workstation Name: [id number]

## 2022-01-19 NOTE — PROCEDURE NOTE
Date of procedure: 22


Pre-op diagnosis: Vag bleed s/p  at home; possible retained products


Post-op diagnosis: same (and placenta removed)


Procedure: 


SURGEON: Yumiko Carrillo MD


PROCEDURE: Suction dilatation and curettage


After the risks, benefits and alternatives of the procedure, consents signed and

the patient was taken to OR via stretcher.  Pt was diaphoretic and blood loss by

QBL 1200cc.  Pt was given general anesthesia and same adequate, she was then 

prepped and draped in sterile fashion in dorsal lithotomy.  Time out was done 

and pt given ancef 2gm antibiotic per protocol.  


Pt already had lowe cath in place draining clear urine, small amount.  Bimanual

exam done and then Speculum placed in the vagina and products of conception 

noted at the os.  Using ring forceps on the anterior lip of the cervix, the 

Products of conception was gently teased out and though irregular it was 

examined on the table and approx 3x1cm appeared absent.   


Attention turned to uterine sound and same done and uterus was 11cm.  The 

suction machine was noted not to be working therefore main OR scrub tech had to 

come and trouble shoot and still the machine not working.  The main OR tech had 

to retrieve their machine in order for this procedure to be done.  The suction 

currette size 14, largest size was then gently advanced to the fundus and in 

clockwise direction, additional products were removed.  Sharp currettage done to

all quadrants using banjo size curret and then suction device placed again in 

the uterine cavity and suction done twice.  Pt with good hemostasis.   Uterine 

sound redone and same now 9cm


Ring forceps removed from the anterior lip of the cervix and no lacerations 

noted.  All instruments removed and sponge count and instrument count was 

correct.  


Pt placed supine and then extubated without difficulty and taken to recovery 

stable





Pathology: placenta and endometrial curettings


INTAKE: 700cc crystalloids, and 250cc PRBC


OUTPUT: 150cc clear


EBL: 150cc


Findings: 


Dilated cervix with Products of conception at the cervical os; No adnexal mass 

palpated and no cervical, vaginal or perineal lacerations noted


Anesthesia: GETA


Surgeon: YUMIKO CARRILLO


Estimated blood loss: other (150cc in  OR and approx 1200cc by QBL while in 

labor suite per nurse report)


Pathology: list (products of conception including placenta and also endometrial 

currettings sent)


Specimen disposition: to lab


Condition: stable


Disposition: floor

## 2022-01-19 NOTE — ANESTHESIA CONSULTATION
Anesthesia Consult and Med Hx


Date of service: 01/19/22





- Airway


Anesthetic Teeth Evaluation: Poor


ROM Head & Neck: Adequate


Mental/Hyoid Distance: Adequate


Mallampati Class: Class II


Intubation Access Assessment: Probably Good





- Pulmonary Exam


CTA: Yes





- Cardiac Exam


Cardiac Exam: RRR





- Pre-Operative Health Status


ASA Pre-Surgery Classification: ASA3, Emergency


Proposed Anesthetic Plan: General, MAC





- Pre-Anesthesia Comment


Pre-Anesthesia Comments: D/CX2, APPY, CLARA- No anesthesia complications





- Pulmonary


Hx Smoking: Yes (1-3cig/[id number])


Hx Asthma: Yes (last many yrs ago)


Hx Respiratory Symptoms: No


SOB: No


COPD: No


Home Oxygen Therapy: No


Hx Pneumonia: No


Hx Sleep Apnea: No





- Cardiovascular System


Hx Hypertension: No


Hx Coronary Artery Disease: No


Hx Heart Attack/AMI: No


Hx Angina: No


Hx Percutaneous Transluminal Coronary Angioplasty (PTCA): No


Hx Cardia Arrhythmia: No


Hx Pacemaker: No


Hx Internal Defibrillator: No


Hx Valvular Heart Disease: No


Hx Heart Murmur: No


Hx Peripheral Vascular Disease: No





- Central Nervous System


Hx Neuromuscular Disorder: No


Hx Seizures: No


CVA: No


Hx Back Pain: No


Hx Psychiatric Problems: No





- Endocrine


Hx Renal Disease: No


Hx End Stage Renal Disease: No


Hx Cirrhosis: No


Hx Liver Disease: No


Hx Insulin Dependent Diabetes: No


Hx Non-Insulin Dependent Diabetes: No


Hx Thyroid Disease: No


Hx Hypothyroidism: No


Hx Hyperthyroidism: No





- Hematic


Hx Anemia: Yes


Hx Sickle Cell Disease: No





- Other Systems


Hx Alcohol Use: No


Hx Substance Use: No


Hx Cancer: No


Hx Obesity: No

## 2022-01-20 VITALS — DIASTOLIC BLOOD PRESSURE: 63 MMHG | SYSTOLIC BLOOD PRESSURE: 106 MMHG

## 2022-01-20 LAB
BASOPHILS # (AUTO): 0 K/MM3 (ref 0–0.1)
BASOPHILS NFR BLD AUTO: 0.8 % (ref 0–1.8)
EOSINOPHIL # BLD AUTO: 0.3 K/MM3 (ref 0–0.4)
EOSINOPHIL NFR BLD AUTO: 5.5 % (ref 0–4.3)
HCT VFR BLD CALC: 26.8 % (ref 30.3–42.9)
HGB BLD-MCNC: 8.8 GM/DL (ref 10.1–14.3)
LYMPHOCYTES # BLD AUTO: 2.9 K/MM3 (ref 1.2–5.4)
LYMPHOCYTES NFR BLD AUTO: 47.5 % (ref 13.4–35)
MCHC RBC AUTO-ENTMCNC: 33 % (ref 30–34)
MCV RBC AUTO: 84 FL (ref 79–97)
MONOCYTES # (AUTO): 0.5 K/MM3 (ref 0–0.8)
MONOCYTES % (AUTO): 8.1 % (ref 0–7.3)
PLATELET # BLD: 222 K/MM3 (ref 140–440)
RBC # BLD AUTO: 3.17 M/MM3 (ref 3.65–5.03)

## 2022-01-20 NOTE — PROGRESS NOTE
Assessment and Plan


POD#1 dilatation and curettage for retained placenta with  at home of fetal 

demise, 2nd trimester; PPH; s/p 2uPRBC doing well


1. Discharge home on oral doxy and pain med.


2. F/U office in one wk





Subjective


Date of service: 22


Principal diagnosis: POD#1 D and C for retained placenta, PPH


Interval history: 


pt states she feels much better and denies dizziness or vaginal bleeding at all.

 Voiding well without difficulty. Denies N/V/F/C





Objective





- Constitutional


Vitals: 


                               Vital Signs - 12hr











  22





  04:00 05:52 08:00


 


Temperature 98.6 F  


 


Pulse Rate 75  


 


Respiratory 18 18 





Rate   


 


Blood Pressure   


 


Blood Pressure 107/72  





[Left]   


 


O2 Sat by Pulse   98





Oximetry   














  22





  08:28 12:24


 


Temperature 98.0 F 98.1 F


 


Pulse Rate 79 81


 


Respiratory 20 20





Rate  


 


Blood Pressure 98/43 97/54


 


Blood Pressure  





[Left]  


 


O2 Sat by Pulse 96 97





Oximetry  











General appearance: Present: no acute distress





- Neck


Neck: normal ROM





- Respiratory


Respiratory effort: normal





- Breasts


Breasts: deferred





- Cardiovascular


Rhythm: regular


Extremities: No edema





- Gastrointestinal


General gastrointestinal: Present: soft, non-tender





- Genitourinary


Female genitourinary: other (Peripad without any blood)





- Integumentary


Integumentary: warm, dry





- Neurologic


Neurologic: moves all extremities





- Psychiatric


Psychiatric: cooperative





- Labs


CBC & Chem 7: 


                                 22 10:49





                                 22 13:41


Labs: 


                              Abnormal lab results











  22 Range/Units





  13:41 13:41 13:41 


 


RBC     (3.65-5.03)  M/mm3


 


Hgb     (10.1-14.3)  gm/dl


 


Hct     (30.3-42.9)  %


 


MCH     (28-32)  pg


 


RDW  16.4 H    (13.2-15.2)  %


 


Lymph % (Auto)     (13.4-35.0)  %


 


Mono % (Auto)     (0.0-7.3)  %


 


Eos % (Auto)  4.6 H    (0.0-4.3)  %


 


Seg Neutrophils %     (40.0-70.0)  %


 


APTT     (24.2-36.6)  Sec.


 


Glucose   125 H   ()  mg/dL


 


Albumin   3.6 L   (3.9-5)  g/dL


 


Crossmatch    See Detail  














  22 Range/Units





  19:07 19:07 23:10 


 


RBC  3.02 L   3.52 L  (3.65-5.03)  M/mm3


 


Hgb  8.3 L D   9.5 L  (10.1-14.3)  gm/dl


 


Hct  25.5 L D   29.8 L  (30.3-42.9)  %


 


MCH    27 L  (28-32)  pg


 


RDW  15.9 H   15.8 H  (13.2-15.2)  %


 


Lymph % (Auto)     (13.4-35.0)  %


 


Mono % (Auto)    7.7 H  (0.0-7.3)  %


 


Eos % (Auto)     (0.0-4.3)  %


 


Seg Neutrophils %  73.0 H    (40.0-70.0)  %


 


APTT   23.7 L   (24.2-36.6)  Sec.


 


Glucose     ()  mg/dL


 


Albumin     (3.9-5)  g/dL


 


Crossmatch     














  22 Range/Units





  10:49 


 


RBC  3.17 L  (3.65-5.03)  M/mm3


 


Hgb  8.8 L  (10.1-14.3)  gm/dl


 


Hct  26.8 L  (30.3-42.9)  %


 


MCH   (28-32)  pg


 


RDW  15.8 H  (13.2-15.2)  %


 


Lymph % (Auto)  47.5 H  (13.4-35.0)  %


 


Mono % (Auto)  8.1 H  (0.0-7.3)  %


 


Eos % (Auto)  5.5 H  (0.0-4.3)  %


 


Seg Neutrophils %  38.1 L  (40.0-70.0)  %


 


APTT   (24.2-36.6)  Sec.


 


Glucose   ()  mg/dL


 


Albumin   (3.9-5)  g/dL


 


Crossmatch   














Medications & Allergies





- Medications


Allergies/Adverse Reactions: 


                                    Allergies





fish derived Allergy (Verified 14 08:41)


   Anaphylaxis








Home Medications: 


                                Home Medications











 Medication  Instructions  Recorded  Confirmed  Last Taken  Type


 


Albuterol Mdi (or & Nicu Only) 2 puff IH QID PRN 01/12/14 10/12/17 02/13/14 

23:00 History





[Proair]     


 


Fluticasone/Salmeterol [Advair 1 puff IH BID 01/12/14 10/12/17 01/12/14 History





Diskus 250-50 mcg]     


 


Pnv95/Ferrous Fumarate/FA 1 each PO QDAY 01/12/14 10/12/17 02/17/14 05:30 

History





[Prenatal Vitamins]    1 tablet 


 


Ibuprofen [Motrin] 800 mg PO Q8HR PRN 21 Days #40 22  Unknown Rx





 tablet    


 


oxyCODONE /ACETAMINOPHEN [Percocet 1 tab PO Q4HR PRN 15 Days #30 tab 22  

Unknown Rx





5/325]     


 


Doxycycline Monohydrate 100 mg PO BID 5 Days #10 cap 22  Unknown Rx











Active Medications: 














Generic Name Dose Route Start Last Admin





  Trade Name Freq  PRN Reason Stop Dose Admin


 


Acetaminophen  650 mg  22 02:34 





  Acetaminophen 325 Mg Tab  PO  





  Q4H PRN  





  Pain MILD(1-3)/Fever >100.5/HA  


 


Bisacodyl  10 mg  22 02:34 





  Bisacodyl 10 Mg Rect Supp  TX  





  BID PRN  





  Constipation  


 


Diphenhydramine HCl  25 mg  22 02:34 





  Diphenhydramine 25 Mg Cap  PO  





  Q6H PRN  





  Itching  


 


Sodium Chloride  500 mls @ 0 mls/hr  22 15:23 





  Nacl 0.9% 500 Ml  IV  





  ONCE GAMA  





  As Directed  


 


Sodium Chloride  500 mls @ 0 mls/hr  22 19:05 





  Nacl 0.9% 500 Ml  IV  





  ONCE GAMA  





  As Directed  


 


Doxycycline Hyclate 100 mg/  250 mls @ 250 mls/hr  22 03:00  22 

05:52





  Sodium Chloride  IV  22 15:59  250 mls/hr





  Q12H GAMA   Administration





  Protocol  


 


Oxytocin/Sodium Chloride  30 units in 500 mls @ 40 mls/hr  22 02:34 





  Pitocin/Ns 30 Unit/500ml  IV  





  TITR GAMA  





  Protocol  


 


Ibuprofen  600 mg  22 02:34 





  Ibuprofen 600 Mg Tab  PO  





  Q6HR GAMA  


 


Magnesium Hydroxide  30 ml  22 02:34 





  Magnesium Hydroxide (Mom) Oral Liqd Udc  PO  





  HS PRN  





  Constipation  


 


Multi-Ingredient Ointment  1 applic  22 02:34 





  Lanolin/Zinc/Dimethicone (Lansinoh) 7 Gm  TP  





  PRN PRN  





  Sore Nipples  


 


Multivitamins/Iron/Calcium  1 each  22 10:00 





  Prenatal Iug10-Mb Fumarate-Folic Acid Vit Tab  PO  





  QDAY GAMA  


 


Ondansetron HCl  4 mg  22 02:34 





  Ondansetron 4 Mg/2 Ml Inj  IV  





  Q8H PRN  





  Nausea And Vomiting  


 


Oxycodone/Acetaminophen  2 tab  22 02:34  22 05:52





  Oxycodone /Acetaminophen 5-325mg Tab  PO   2 tab





  Q4H PRN   Administration





  Pain, Moderate (4-6)  


 


Promethazine HCl  25 mg  22 02:34 





  Promethazine 25 Mg Rect Supp  TX  





  Q6H PRN  





  Nausea And Vomiting  


 


Promethazine HCl  25 mg  22 02:34 





  Promethazine 25 Mg Tab  PO  





  Q6H PRN  





  Nausea And Vomiting  


 


Sodium Chloride  10 ml  22 02:34 





  Sodium Chloride 0.9% 10 Ml Flush Syringe  IV  





  PRN PRN  





  LINE FLUSH  


 


Witch Hazel/Glycerin  1 each  22 02:34 





  Witch Hazel/ Glycerin Pad  TP  





  PRN PRN  





  Hemorrhoid/cleansing/soothing

## 2022-01-20 NOTE — DISCHARGE SUMMARY
Providers





- Providers


Date of Admission: 


22 13:04





Date of discharge: 22


Attending physician: 


AUSTIN CARRILLO





                                        





22 11:53


Consult to Case Management [CONS] Routine 


   Services Needed at Discharge: 


   Notified:: Jennifer Case Management


   Phone number called:: [phone]


   Was contact made?: Yes


   If yes, spoke with:: Jennifer


   Time called:: 11:54


   Comment:: Consult placed for positive amphetamine











Primary care physician: 


PRIMARY CARE MD








Hospitalization


Reason for admission: vaginal bleeding (with fetal demise in hand passed at 

home)


Delivery:  (at home)


Episiotomy: none


Laceration: none


Other postpartum procedures: curettage


Postpartum complications: retained placenta


Discharge diagnosis: other (Fetal demise ?2nd trimester, retained placenta)


Hospital course: 


Multiparous pt came after delivering baby at home with demise, ?2nd trimester, 

no prenatal care.  Pt was actively bleeding and given IV fluids, Blood 

transfusion [id number] and surgical procedure dilatation and curettage for retained

 placenta that stopped her bleeding.   Pathology with placenta and endometrial 

curettings sent and results pending.  Pt was observed post operatively and she 

felt normal on post op day 1 and was ready to go home.  Pt to see her PMD for 

continued treatment of ADHD on ritalin med, pt states.  Pt was given doxy abx 

and pain meds percocet and motrin and iron supplement.  All questions encouraged

 and answered.


Disposition: 30 STILL A PATIENT





Plan





- Discharge Medications


Prescriptions: 


Doxycycline Monohydrate 100 mg PO BID 5 Days #10 cap


Ferrous Sulfate [Ferrous Sulfate 324 MG] 324 mg PO BID 30 Days #60


Ibuprofen [Motrin] 800 mg PO Q8HR PRN 21 Days #40 tablet


 PRN Reason: Pain, Moderate (4-6)


oxyCODONE /ACETAMINOPHEN [Percocet 5/325] 1 tab PO Q4HR PRN 15 Days #30 tab


 PRN Reason: Pain , Severe (7-10)





- Provider Discharge Summary


Additional instructions: 


[]  Smoking cessation referral if applicable(refer to patient education folder 

for contact #)


[]  Refer to [facility] Booklet








Call your doctor immediately for:


* Fever > 100.5


* Heavy vaginal bleeding ( >1 pad per hour)


* Severe persistent headache


* Shortness of breath


* Reddened, hot, painful area to leg or breast


* Drainage or odor from incision.





* Keep incision clean and dry at all times and follow doctor's instructions 

regarding bathing/showering











- Follow up plan


Follow up: 


PRIMARY CARE,MD [Primary Care Provider] - 7 Days


AUSTIN CARRILLO MD [Staff Physician] - 7 Days